# Patient Record
Sex: FEMALE | Race: WHITE | URBAN - METROPOLITAN AREA
[De-identification: names, ages, dates, MRNs, and addresses within clinical notes are randomized per-mention and may not be internally consistent; named-entity substitution may affect disease eponyms.]

---

## 2018-08-09 LAB — PAP SMEAR - HIM PATIENT REPORTED: NEGATIVE

## 2018-08-29 ENCOUNTER — PRE VISIT (OUTPATIENT)
Dept: UROLOGY | Facility: CLINIC | Age: 58
End: 2018-08-29

## 2018-08-29 NOTE — TELEPHONE ENCOUNTER
Patient with history of stress incontinence coming in for Pelvital study consult with Dr. Banegas. Patient chart reviewed, no need for call, all records available and ready for appointment.

## 2018-09-04 ENCOUNTER — PATIENT OUTREACH (OUTPATIENT)
Dept: CARE COORDINATION | Facility: CLINIC | Age: 58
End: 2018-09-04

## 2018-09-05 ENCOUNTER — OFFICE VISIT (OUTPATIENT)
Dept: UROLOGY | Facility: CLINIC | Age: 58
End: 2018-09-05

## 2018-09-05 VITALS
BODY MASS INDEX: 32.78 KG/M2 | WEIGHT: 192 LBS | DIASTOLIC BLOOD PRESSURE: 80 MMHG | SYSTOLIC BLOOD PRESSURE: 146 MMHG | HEART RATE: 60 BPM | HEIGHT: 64 IN

## 2018-09-05 DIAGNOSIS — N95.2 ATROPHIC VAGINITIS: ICD-10-CM

## 2018-09-05 DIAGNOSIS — N39.3 STRESS INCONTINENCE: Primary | ICD-10-CM

## 2018-09-05 RX ORDER — MULTIVITAMIN
TABLET ORAL
COMMUNITY
Start: 2005-07-19

## 2018-09-05 RX ORDER — FLUTICASONE PROPIONATE 50 MCG
2 SPRAY, SUSPENSION (ML) NASAL
COMMUNITY

## 2018-09-05 ASSESSMENT — PAIN SCALES - GENERAL: PAINLEVEL: NO PAIN (0)

## 2018-09-05 ASSESSMENT — ENCOUNTER SYMPTOMS
POOR WOUND HEALING: 0
NAIL CHANGES: 0
SKIN CHANGES: 1

## 2018-09-05 NOTE — PROGRESS NOTES
"CC:Stress incontinence    HPI:  Jennifer Demarco is a 58 year old female new to our clinic for the above.  This problem has been going on for several years.  She did have a TVT placed 2016 but it was not successful at all.  She continues to leak with coughing and sneezing as well as running.  She uses 1 pad per day.    The patient voids qfew hours, nocturia X 0.  She drinks mainly water and some wine and 2 cups of coffee per day.  She denies any dysuria, hematuria, hesitancy, intermittency, feeling of incomplete emptying, or any recent hx of UTI's or stones.    The patient has no constipation or splinting.  She is sexually active but has some dyspareunia due to dryness.   She denies any vaginal bulge.  She has no neurological or balance problems.     Obstetric Hx:  She is .  The weight of her largest baby was 7 lbs 15oz.  Babies were delivered vaginaly.  Menopause early 50's, HRT:  none    Past Medical History:   Diagnosis Date     Eustachian tube dysfunction      EZEQUIEL (stress urinary incontinence, female)        Past Surgical History:   Procedure Laterality Date     APPENDECTOMY       SLING TRANSVAGINAL       XR URODYNAMIC FLOW STUDY FLUOROSCOPY         Meds, Allergies, FHx and SHx reviewed per nurse's intake note.    ROS is below.  All other positive and pertinent information is mentioned in the HPI.    PEx:   Blood pressure 146/80, pulse 60, height 1.613 m (5' 3.5\"), weight 87.1 kg (192 lb).  5' 3.5\", Body mass index is 33.48 kg/(m^2)., 192 lbs 0 oz  Gen appearance:  Well groomed  HEENT:  EOMI, AT NC  Psych:  Normal Affect  Neuro:  A/O X 3  Skin:  Warm to touch  Resp:  No increased respiratory effort  Vasc:  RRR  lymph:  No LE edema  Abd:  Soft/NT, ND, no palpable masses  Musk:  Full ROM in extremities  :  Normal external genitalia and introitus, minimal atrophic changes          Urethra with some hypermobility but not much          Stress incontinence was demonstrated with coughing          no cystocele " and no rectocele          Pelvic floor muscles are of normal tonicity, non tender          able to localize pelvic floor muscles.  Perineum WNL.hours.    Patient Outreach on 09/04/2018   Component Date Value Ref Range Status     PAP Smear - HIM Patient Reported 08/09/2018 Negative   Final     ASSESSMENT and PLAN:  This is a 58 year old female with stress urinary incontinence with previous hx of sling as well as atrophic vaginitis.  Different management options were discussed with the patient including observation, PT, Pelvital study, repeat sling, and periurethral bulking.  The patient has elected to proceed with Pelvital study.  -enroll into study  -f/u per protocol.    Thank you for allowing me to participate in Ms. Demaroc's care.  I will keep you updated on her progress.    Brittney Banegas MD  Answers for HPI/ROS submitted by the patient on 9/5/2018   General Symptoms: No  Skin Symptoms: Yes  HENT Symptoms: No  EYE SYMPTOMS: No  HEART SYMPTOMS: No  LUNG SYMPTOMS: No  INTESTINAL SYMPTOMS: No  URINARY SYMPTOMS: No  GYNECOLOGIC SYMPTOMS: No  BREAST SYMPTOMS: No  SKELETAL SYMPTOMS: No  BLOOD SYMPTOMS: No  NERVOUS SYSTEM SYMPTOMS: No  MENTAL HEALTH SYMPTOMS: No  Changes in hair: No  Changes in moles/birth marks: Yes  Itching: No  Rashes: No  Changes in nails: No  Acne: No  Hair in places you don't want it: No  Change in facial hair: No  Warts: No  Non-healing sores: No  Scarring: No  Flaking of skin: No  Color changes of hands/feet in cold : No  Sun sensitivity: No  Skin thickening: No  PHQ-2 Score: 0

## 2018-09-05 NOTE — NURSING NOTE
"Chief Complaint   Patient presents with     Consult     stress incontinence       Blood pressure 146/80, pulse 60, height 1.613 m (5' 3.5\"), weight 87.1 kg (192 lb). Body mass index is 33.48 kg/(m^2).    There is no problem list on file for this patient.      No Known Allergies    Current Outpatient Prescriptions   Medication Sig Dispense Refill     Multiple vitamin TABS        fluticasone (FLONASE) 50 MCG/ACT spray 2 sprays         Social History   Substance Use Topics     Smoking status: Never Smoker     Smokeless tobacco: Never Used     Alcohol use Yes      Comment: 1 glass of wine per week       Phoebe Pike LPN  9/5/2018  1:14 PM    "

## 2018-09-05 NOTE — MR AVS SNAPSHOT
"              After Visit Summary   2018    Jennifer Demarco    MRN: [id number]           Patient Information     Date Of Birth          1960        Visit Information        Provider Department      2018 1:00 PM Brittney Banegas MD [facility] Urology and [facility] for Prostate and Urologic Cancers        Today's Diagnoses     Stress incontinence    -  1    Atrophic vaginitis           Follow-ups after your visit        Follow-up notes from your care team     Return for per protocol..      Who to contact     Please call your clinic at [phone] to:    Ask questions about your health    Make or cancel appointments    Discuss your medicines    Learn about your test results    Speak to your doctor            Additional Information About Your Visit        MyChart Information     [employer] is an electronic gateway that provides easy, online access to your medical records. With [employer], you can request a clinic appointment, read your test results, renew a prescription or communicate with your care team.     To sign up for [employer] visit the website at [email or web address]   You will be asked to enter the access code listed below, as well as some personal information. Please follow the directions to create your username and password.     Your access code is: [email or web address]  Expires: 2018 12:51 PM     Your access code will  in 90 days. If you need help or a new code, please contact your [facility] or call [phone] for assistance.        Care EveryWhere ID     This is your Care EveryWhere ID. This could be used by other organizations to access your [facility] medical records  [id number]        Your Vitals Were     Pulse Height BMI (Body Mass Index)             60 1.613 m (5' 3.5\") 33.48 kg/m2          Blood Pressure from Last 3 Encounters:   18 146/80    Weight from Last 3 Encounters:   18 87.1 kg (192 lb)              Today, you had the following     No " orders found for display         Today's Medication Changes          These changes are accurate as of 9/5/18  1:48 PM.  If you have any questions, ask your nurse or doctor.               Start taking these medicines.        Dose/Directions    COMPOUNDED NON-CONTROLLED SUBSTANCE - PHARMACY TO MIX COMPOUNDED MEDICATION   Commonly known as:  CMPD RX   Used for:  Atrophic vaginitis   Started by:  Brittney Banegas MD        Estriol 1 mg/g Apply small amount to finger and apply to inside vagina daily for 2 weeks then twice weekly Route: vaginally   Quantity:  30 g   Refills:  11            Where to get your medicines      Some of these will need a paper prescription and others can be bought over the counter.  Ask your nurse if you have questions.     Bring a paper prescription for each of these medications     COMPOUNDED NON-CONTROLLED SUBSTANCE - PHARMACY TO MIX COMPOUNDED MEDICATION                Primary Care Provider Office Phone # Fax #    Jami Jackie Conte [phone]       [place] [address]        Equal Access to Services     KALIN GIBSON AH: Hadii jasmine ku hadasho Soomaali, waaxda luqadaha, qaybta kaalmada adeegyada, waxay locin haymandeepn andrew vanegas . So [facility] [phone].    ATENCIÓN: Si habla español, tiene a marc disposición servicios gratuitos de asistencia lingüística. Llame al [phone].    We comply with applicable federal civil rights laws and [place] laws. We do not discriminate on the basis of race, color, national origin, age, disability, sex, sexual orientation, or gender identity.            Thank you!     Thank you for choosing [facility] UROLOGY AND [facility] FOR PROSTATE AND UROLOGIC CANCERS  for your care. Our goal is always to provide you with excellent care. Hearing back from our patients is one way we can continue to improve our services. Please take a few minutes to complete the written survey that you may receive in the mail after your  visit with us. Thank you!             Your Updated Medication List - Protect others around you: Learn how to safely use, store and throw away your medicines at www.disposemymeds.org.          This list is accurate as of 9/5/18  1:48 PM.  Always use your most recent med list.                   Brand Name Dispense Instructions for use Diagnosis    COMPOUNDED NON-CONTROLLED SUBSTANCE - PHARMACY TO MIX COMPOUNDED MEDICATION    CMPD RX    30 g    Estriol 1 mg/g Apply small amount to finger and apply to inside vagina daily for 2 weeks then twice weekly Route: vaginally    Atrophic vaginitis       fluticasone 50 MCG/ACT spray    FLONASE     2 sprays        Multiple vitamin Tabs

## 2018-09-05 NOTE — LETTER
"2018     RE: Jennifer Demarco  [address]  [address]  [place] [address]     Dear Colleague,    Thank you for referring your patient, Jennifer Demarco, to the [facility] UROLOGY AND INST FOR PROSTATE AND UROLOGIC CANCERS at [facility]. Please see a copy of my visit note below.    CC:Stress incontinence    HPI:  Jennifer Demarco is a 58 year old female new to our clinic for the above.  This problem has been going on for several years.  She did have a TVT placed  but it was not successful at all.  She continues to leak with coughing and sneezing as well as running.  She uses 1 pad per day.    The patient voids qfew hours, nocturia X 0.  She drinks mainly water and some wine and 2 cups of coffee per day.  She denies any dysuria, hematuria, hesitancy, intermittency, feeling of incomplete emptying, or any recent hx of UTI's or stones.    The patient has no constipation or splinting.  She is sexually active but has some dyspareunia due to dryness.   She denies any vaginal bulge.  She has no neurological or balance problems.     Obstetric Hx:  She is .  The weight of her largest baby was 7 lbs 15oz.  Babies were delivered vaginaly.  Menopause early 50's, HRT:  none    Past Medical History:   Diagnosis Date     Eustachian tube dysfunction      EZEQUIEL (stress urinary incontinence, female)        Past Surgical History:   Procedure Laterality Date     APPENDECTOMY       SLING TRANSVAGINAL       XR URODYNAMIC FLOW STUDY FLUOROSCOPY         Meds, Allergies, FHx and SHx reviewed per nurse's intake note.    ROS is below.  All other positive and pertinent information is mentioned in the HPI.    PEx:   Blood pressure 146/80, pulse 60, height 1.613 m (5' 3.5\"), weight 87.1 kg (192 lb).  5' 3.5\", Body mass index is 33.48 kg/(m^2)., 192 lbs 0 oz  Gen appearance:  Well groomed  HEENT:  EOMI, AT NC  Psych:  Normal Affect  Neuro:  A/O X 3  Skin:  Warm to touch  Resp:  No increased " respiratory effort  Vasc:  RRR  lymph:  No LE edema  Abd:  Soft/NT, ND, no palpable masses  Musk:  Full ROM in extremities  :  Normal external genitalia and introitus, minimal atrophic changes          Urethra with some hypermobility but not much          Stress incontinence was demonstrated with coughing          no cystocele and no rectocele          Pelvic floor muscles are of normal tonicity, non tender          able to localize pelvic floor muscles.  Perineum WNL.hours.    Patient Outreach on 09/04/2018   Component Date Value Ref Range Status     PAP Smear - HIM Patient Reported 08/09/2018 Negative   Final     ASSESSMENT and PLAN:  This is a 58 year old female with stress urinary incontinence with previous hx of sling as well as atrophic vaginitis.  Different management options were discussed with the patient including observation, PT, Pelvital study, repeat sling, and periurethral bulking.  The patient has elected to proceed with Pelvital study.  -enroll into study  -f/u per protocol.    Thank you for allowing me to participate in Ms. Demarco's care.  I will keep you updated on her progress.    Brittney Banegas MD  Answers for HPI/ROS submitted by the patient on 9/5/2018   General Symptoms: No  Skin Symptoms: Yes  HENT Symptoms: No  EYE SYMPTOMS: No  HEART SYMPTOMS: No  LUNG SYMPTOMS: No  INTESTINAL SYMPTOMS: No  URINARY SYMPTOMS: No  GYNECOLOGIC SYMPTOMS: No  BREAST SYMPTOMS: No  SKELETAL SYMPTOMS: No  BLOOD SYMPTOMS: No  NERVOUS SYSTEM SYMPTOMS: No  MENTAL HEALTH SYMPTOMS: No  Changes in hair: No  Changes in moles/birth marks: Yes  Itching: No  Rashes: No  Changes in nails: No  Acne: No  Hair in places you don't want it: No  Change in facial hair: No  Warts: No  Non-healing sores: No  Scarring: No  Flaking of skin: No  Color changes of hands/feet in cold : No  Sun sensitivity: No  Skin thickening: No  PHQ-2 Score: 0    Again, thank you for allowing me to participate in the care of your patient.       Sincerely,    Brittney Banegas MD

## 2018-09-19 ENCOUNTER — APPOINTMENT (OUTPATIENT)
Dept: UROLOGY | Facility: CLINIC | Age: 58
End: 2018-09-19
Payer: COMMERCIAL

## 2018-09-26 ENCOUNTER — ALLIED HEALTH/NURSE VISIT (OUTPATIENT)
Dept: UROLOGY | Facility: CLINIC | Age: 58
End: 2018-09-26

## 2018-09-26 DIAGNOSIS — N39.3 STRESS INCONTINENCE: Primary | ICD-10-CM

## 2018-09-26 NOTE — NURSING NOTE
Patient here for device teaching. Patient was shown how to use the pelvital device and how to troubleshoot.   She is to wait the first yellow light then it okay to squeeze. Cleaning the device demonstrated  Patient verbalized understanding    Jaqui Reddy RN   Care Coordinator Urology

## 2018-09-26 NOTE — MR AVS SNAPSHOT
After Visit Summary   2018    Jennifer Demarco    MRN: [id number]           Patient Information     Date Of Birth          1960        Visit Information        Provider Department      2018 7:00 AM Coordinator,  Urology Research [facility] Urology and [facility] for Prostate and Urologic Cancers        Today's Diagnoses     Stress incontinence    -  1       Follow-ups after your visit        Who to contact     Please call your clinic at [phone] to:    Ask questions about your health    Make or cancel appointments    Discuss your medicines    Learn about your test results    Speak to your doctor            Additional Information About Your Visit        MyChart Information     [employer] is an electronic gateway that provides easy, online access to your medical records. With [employer], you can request a clinic appointment, read your test results, renew a prescription or communicate with your care team.     To sign up for [employer] visit the website at [email or web address]   You will be asked to enter the access code listed below, as well as some personal information. Please follow the directions to create your username and password.     Your access code is: [email or web address]  Expires: 2018 12:51 PM     Your access code will  in 90 days. If you need help or a new code, please contact your [facility] or call [phone] for assistance.        Care EveryWhere ID     This is your Care EveryWhere ID. This could be used by other organizations to access your [place] medical records  [id number]         Blood Pressure from Last 3 Encounters:   18 146/80    Weight from Last 3 Encounters:   18 87.1 kg (192 lb)              Today, you had the following     No orders found for display       Primary Care Provider Office Phone # Fax #    Jami Conte [phone]       [address] [address]         Equal Access to Services     [facility] Memorial HospitalJENNYFER : Hadii aad ku haddebbieniharika Annaali, waessieda luqyamilha, qamyra sharmilaagustochar freire. So [facility] [phone].    ATENCIÓN: Si habla español, tiene a marc disposición servicios gratuitos de asistencia lingüística. Llame al [phone].    We comply with applicable federal civil rights laws and [place] laws. We do not discriminate on the basis of race, color, national origin, age, disability, sex, sexual orientation, or gender identity.            Thank you!     Thank you for choosing [facility] UROLOGY AND [facility] FOR PROSTATE AND UROLOGIC CANCERS  for your care. Our goal is always to provide you with excellent care. Hearing back from our patients is one way we can continue to improve our services. Please take a few minutes to complete the written survey that you may receive in the mail after your visit with us. Thank you!             Your Updated Medication List - Protect others around you: Learn how to safely use, store and throw away your medicines at www.disposemymeds.org.          This list is accurate as of 9/26/18  7:32 AM.  Always use your most recent med list.                   Brand Name Dispense Instructions for use Diagnosis    COMPOUNDED NON-CONTROLLED SUBSTANCE - PHARMACY TO MIX COMPOUNDED MEDICATION    CMPD RX    30 g    Estriol 1 mg/g Apply small amount to finger and apply to inside vagina daily for 2 weeks then twice weekly Route: vaginally    Atrophic vaginitis       fluticasone 50 MCG/ACT spray    FLONASE     2 sprays        Multiple vitamin Tabs

## 2018-10-10 ENCOUNTER — APPOINTMENT (OUTPATIENT)
Dept: UROLOGY | Facility: CLINIC | Age: 58
End: 2018-10-10

## 2018-10-24 ENCOUNTER — APPOINTMENT (OUTPATIENT)
Dept: UROLOGY | Facility: CLINIC | Age: 58
End: 2018-10-24
Payer: COMMERCIAL

## 2018-11-07 ENCOUNTER — ALLIED HEALTH/NURSE VISIT (OUTPATIENT)
Dept: UROLOGY | Facility: CLINIC | Age: 58
End: 2018-11-07

## 2018-11-07 DIAGNOSIS — N39.3 STRESS INCONTINENCE: Primary | ICD-10-CM

## 2018-11-07 NOTE — NURSING NOTE
Patient here for device retraining for the Pelvital study. I instructed the patient to squeeze around the probe and to wait for the light to come on and go off  Patient verbalized understanding  Jennifer Demarco comes into clinic today at the request of [facility] Ordering Provider for Pt Teaching pelvital device.        This service provided today was under the supervising provider of the day Julia Martinez PA-C, who was available if needed.      Jaqui Reddy, RN   Care Coordinator Urology

## 2018-11-07 NOTE — MR AVS SNAPSHOT
After Visit Summary   11/7/2018    Jennifer Demarco    MRN: [id number]           Patient Information     Date Of Birth          1960        Visit Information        Provider Department      11/7/2018 7:00 AM Coordinator,  Urology Research [facility] Urology and Inst for Prostate and Urologic Cancers        Today's Diagnoses     Stress incontinence    -  1       Follow-ups after your visit        Your next 10 appointments already scheduled     Nov 26, 2018  7:30 AM CST   RESEARCH with  Urology Research Coordinator   [facility] Urology and Inst for Prostate and Urologic Cancers ([facility])    [address] [place] [id number] [phone]            Dec 05, 2018  7:30 AM CST   RESEARCH with  Urology Research Coordinator   [facility] Urology and [facility] for Prostate and Urologic Cancers ([facility])    [address] [place] [id number] [phone]            Dec 19, 2018  8:00 AM CST   RESEARCH with  Urology Research Coordinator   [facility] Urology and Inst for Prostate and Urologic Cancers ([facility])    [address] [place] [id number] [phone]            Dec 19, 2018  8:15 AM CST   (Arrive by 8:00 AM)   Return Visit with Brittney Banegas MD   [facility] Urology and [facility] for Prostate and Urologic Cancers ([facility])    [address] [place] [id number] [phone]              Who to contact     Please call your clinic at [phone] to:    Ask questions about your health    Make or cancel appointments    Discuss your medicines    Learn about your test results    Speak to your doctor            Additional Information About Your Visit        Care EveryWhere ID     This is your Care EveryWhere ID. This could be used by other organizations to access your [facility]  records  [id number]         Blood Pressure from Last 3 Encounters:   09/05/18 146/80    Weight from Last 3 Encounters:   09/05/18 87.1 kg (192 lb)              Today, you had the following     No orders found for display       Primary Care Provider Office Phone # Fax Mary Conte [phone]       [place] [address]  [facility] [address]        Equal Access to Services     KALIN GIBSON : Hadii aad ku hadasho Soomaali, waaxda luqadaha, qaybta kaalmada adeegyada, waxay idiin hayaan adeeg kharash la'aan ah. So [facility] [phone].    ATENCIÓN: Si carolla arun, tiene a marc disposición servicios gratuitos de asistencia lingüística. LlWhite Hospital [phone].    We comply with applicable federal civil rights laws and [place] laws. We do not discriminate on the basis of race, color, national origin, age, disability, sex, sexual orientation, or gender identity.            Thank you!     Thank you for choosing [facility] UROLOGY AND [facility] FOR PROSTATE AND UROLOGIC CANCERS  for your care. Our goal is always to provide you with excellent care. Hearing back from our patients is one way we can continue to improve our services. Please take a few minutes to complete the written survey that you may receive in the mail after your visit with us. Thank you!             Your Updated Medication List - Protect others around you: Learn how to safely use, store and throw away your medicines at www.disposemymeds.org.          This list is accurate as of 11/7/18  9:50 AM.  Always use your most recent med list.                   Brand Name Dispense Instructions for use Diagnosis    COMPOUNDED NON-CONTROLLED SUBSTANCE - PHARMACY TO MIX COMPOUNDED MEDICATION    CMPD RX    30 g    Estriol 1 mg/g Apply small amount to finger and apply to inside vagina daily for 2 weeks then twice weekly Route: vaginally    Atrophic vaginitis       fluticasone 50 MCG/ACT spray    FLONASE     2 sprays        Multiple vitamin Tabs

## 2018-11-28 ENCOUNTER — APPOINTMENT (OUTPATIENT)
Dept: UROLOGY | Facility: CLINIC | Age: 58
End: 2018-11-28

## 2018-12-05 ENCOUNTER — APPOINTMENT (OUTPATIENT)
Dept: UROLOGY | Facility: CLINIC | Age: 58
End: 2018-12-05

## 2018-12-11 ENCOUNTER — PRE VISIT (OUTPATIENT)
Dept: UROLOGY | Facility: CLINIC | Age: 58
End: 2018-12-11

## 2018-12-11 NOTE — PROGRESS NOTES
Patient with history of stress incontinence coming in for Pelvital study follow up with Dr. Banegas. Patient chart reviewed, no need for call, all records available and ready for appointment.

## 2018-12-19 ENCOUNTER — OFFICE VISIT (OUTPATIENT)
Dept: UROLOGY | Facility: CLINIC | Age: 58
End: 2018-12-19

## 2018-12-19 ENCOUNTER — APPOINTMENT (OUTPATIENT)
Dept: UROLOGY | Facility: CLINIC | Age: 58
End: 2018-12-19

## 2018-12-19 VITALS
WEIGHT: 181 LBS | HEIGHT: 64 IN | HEART RATE: 62 BPM | SYSTOLIC BLOOD PRESSURE: 147 MMHG | BODY MASS INDEX: 30.9 KG/M2 | DIASTOLIC BLOOD PRESSURE: 81 MMHG

## 2018-12-19 DIAGNOSIS — N39.3 STRESS INCONTINENCE: Primary | ICD-10-CM

## 2018-12-19 ASSESSMENT — MIFFLIN-ST. JEOR: SCORE: 1368.07

## 2018-12-19 ASSESSMENT — PAIN SCALES - GENERAL: PAINLEVEL: NO PAIN (0)

## 2018-12-19 NOTE — LETTER
"12/19/2018       RE: Jennifer Demarco  [address]  [address]  [place] [address]     Dear Colleague,    Thank you for referring your patient, Jennifer Demarco, to the [facility] UROLOGY AND INST FOR PROSTATE AND UROLOGIC CANCERS at [facility]. Please see a copy of my visit note below.    Reason for Visit:  12 week f/u with pelvitals    Clinical Data:  Ms. Salinas is a 57 y/o female with stress incontinence who is on the pelvital trial.  She was in the control group initially and saw some improvement but then crossed over and feels she is 80% better.    She did the exercises every day except for 3 days.  And she did it for 5 min per day.    She denies any difficulties or side effects.    /81   Pulse 62   Ht 1.613 m (5' 3.5\")   Wt 82.1 kg (181 lb)   BMI 31.56 kg/m       A/P:  57 y/o female with stress incontinence significantly improved with Pelvital.  She is not interested in any further therapy at this time and will continue with the exercises for now.  We discussed vaginal estrogen cream.  She will talk to her daughter who is an ob.  -f/u with Ghazal in 6 months for f/u.    Thank you for allowing me to participate in the care of  Ms. Jennifer Demarco and I will keep you updated on her progress.    Brittney Banegas MD  "

## 2018-12-19 NOTE — PROGRESS NOTES
"Reason for Visit:  12 week f/u with pelvitals    Clinical Data:  Ms. Salinas is a 57 y/o female with stress incontinence who is on the pelvital trial.  She was in the control group initially and saw some improvement but then crossed over and feels she is 80% better.    She did the exercises every day except for 3 days.  And she did it for 5 min per day.    She denies any difficulties or side effects.    /81   Pulse 62   Ht 1.613 m (5' 3.5\")   Wt 82.1 kg (181 lb)   BMI 31.56 kg/m      A/P:  57 y/o female with stress incontinence significantly improved with Pelvital.  She is not interested in any further therapy at this time and will continue with the exercises for now.  We discussed vaginal estrogen cream.  She will talk to her daughter who is an ob.  -f/u with [facility] in 6 months for f/u.    Thank you for allowing me to participate in the care of  Ms. Jennifer Demarco and I will keep you updated on her progress.    Brittney Banegas MD  "

## 2018-12-19 NOTE — NURSING NOTE
"Chief Complaint   Patient presents with     Follow Up     Pelvital study follow up, stress incontinence       Blood pressure 147/81, pulse 62, height 1.613 m (5' 3.5\"), weight 82.1 kg (181 lb). Body mass index is 31.56 kg/m .    There is no problem list on file for this patient.      No Known Allergies    Current Outpatient Medications   Medication Sig Dispense Refill     COMPOUNDED NON-CONTROLLED SUBSTANCE (CMPD RX) - PHARMACY TO MIX COMPOUNDED MEDICATION Estriol 1 mg/g  Apply small amount to finger and apply to inside vagina daily for 2 weeks then twice weekly  Route: vaginally 30 g 11     fluticasone (FLONASE) 50 MCG/ACT spray 2 sprays       Multiple vitamin TABS          Social History     Tobacco Use     Smoking status: Never Smoker     Smokeless tobacco: Never Used   Substance Use Topics     Alcohol use: Yes     Comment: 1 glass of wine per week     Drug use: No       Phoebe Pike LPN  12/19/2018  8:10 AM    "